# Patient Record
Sex: FEMALE | Race: WHITE | NOT HISPANIC OR LATINO | Employment: FULL TIME | URBAN - METROPOLITAN AREA
[De-identification: names, ages, dates, MRNs, and addresses within clinical notes are randomized per-mention and may not be internally consistent; named-entity substitution may affect disease eponyms.]

---

## 2023-07-03 ENCOUNTER — TELEPHONE (OUTPATIENT)
Dept: INTERNAL MEDICINE | Facility: CLINIC | Age: 63
End: 2023-07-03
Payer: COMMERCIAL

## 2023-07-03 NOTE — TELEPHONE ENCOUNTER
----- Message from Keisha Shanks MA sent at 6/30/2023  3:53 PM CDT -----  Regarding: Refill Request  Who Called: DARIA BONE [[id number]]           New Prescription or Refill : Refill      RX Name and Strength:   Fluid pill , pt does not know the name        30 day or 90 day RX: 30           Local or Mail Order :local            Preferred Pharmacy:[id number]  [address] [place] [id number]  AT [facility] OF [facility] [address]      Would the patient rather a call back or a response via [facility]? call        Best Call Back Number:  [phone] or [phone]

## 2023-07-10 ENCOUNTER — LAB VISIT (OUTPATIENT)
Dept: LAB | Facility: OTHER | Age: 63
End: 2023-07-10
Attending: FAMILY MEDICINE
Payer: COMMERCIAL

## 2023-07-10 ENCOUNTER — OFFICE VISIT (OUTPATIENT)
Dept: INTERNAL MEDICINE | Facility: CLINIC | Age: 63
End: 2023-07-10
Attending: FAMILY MEDICINE
Payer: COMMERCIAL

## 2023-07-10 VITALS
BODY MASS INDEX: 24.18 KG/M2 | OXYGEN SATURATION: 96 % | DIASTOLIC BLOOD PRESSURE: 70 MMHG | HEIGHT: 59 IN | HEART RATE: 85 BPM | WEIGHT: 119.94 LBS | SYSTOLIC BLOOD PRESSURE: 160 MMHG

## 2023-07-10 DIAGNOSIS — H66.002 NON-RECURRENT ACUTE SUPPURATIVE OTITIS MEDIA OF LEFT EAR WITHOUT SPONTANEOUS RUPTURE OF TYMPANIC MEMBRANE: ICD-10-CM

## 2023-07-10 DIAGNOSIS — H91.90 HEARING LOSS, UNSPECIFIED HEARING LOSS TYPE, UNSPECIFIED LATERALITY: ICD-10-CM

## 2023-07-10 DIAGNOSIS — I10 HTN (HYPERTENSION), BENIGN: ICD-10-CM

## 2023-07-10 DIAGNOSIS — Z00.00 PREVENTATIVE HEALTH CARE: ICD-10-CM

## 2023-07-10 DIAGNOSIS — Z12.31 ENCOUNTER FOR SCREENING MAMMOGRAM FOR MALIGNANT NEOPLASM OF BREAST: ICD-10-CM

## 2023-07-10 DIAGNOSIS — R94.31 ABNORMAL EKG: ICD-10-CM

## 2023-07-10 DIAGNOSIS — Z00.00 PREVENTATIVE HEALTH CARE: Primary | ICD-10-CM

## 2023-07-10 LAB
ALBUMIN SERPL BCP-MCNC: 4.3 G/DL (ref 3.5–5.2)
ALP SERPL-CCNC: 75 U/L (ref 55–135)
ALT SERPL W/O P-5'-P-CCNC: 23 U/L (ref 10–44)
ANION GAP SERPL CALC-SCNC: 11 MMOL/L (ref 8–16)
AST SERPL-CCNC: 25 U/L (ref 10–40)
BASOPHILS # BLD AUTO: 0.1 K/UL (ref 0–0.2)
BASOPHILS NFR BLD: 0.8 % (ref 0–1.9)
BILIRUB SERPL-MCNC: 0.4 MG/DL (ref 0.1–1)
BUN SERPL-MCNC: 8 MG/DL (ref 8–23)
CALCIUM SERPL-MCNC: 10.2 MG/DL (ref 8.7–10.5)
CHLORIDE SERPL-SCNC: 104 MMOL/L (ref 95–110)
CHOLEST SERPL-MCNC: 237 MG/DL (ref 120–199)
CHOLEST/HDLC SERPL: 3.7 {RATIO} (ref 2–5)
CO2 SERPL-SCNC: 24 MMOL/L (ref 23–29)
CREAT SERPL-MCNC: 0.8 MG/DL (ref 0.5–1.4)
DIFFERENTIAL METHOD: ABNORMAL
EOSINOPHIL # BLD AUTO: 0.2 K/UL (ref 0–0.5)
EOSINOPHIL NFR BLD: 1.4 % (ref 0–8)
ERYTHROCYTE [DISTWIDTH] IN BLOOD BY AUTOMATED COUNT: 13.6 % (ref 11.5–14.5)
EST. GFR  (NO RACE VARIABLE): >60 ML/MIN/1.73 M^2
ESTIMATED AVG GLUCOSE: 103 MG/DL (ref 68–131)
GLUCOSE SERPL-MCNC: 88 MG/DL (ref 70–110)
HBA1C MFR BLD: 5.2 % (ref 4–5.6)
HCT VFR BLD AUTO: 51.8 % (ref 37–48.5)
HDLC SERPL-MCNC: 64 MG/DL (ref 40–75)
HDLC SERPL: 27 % (ref 20–50)
HGB BLD-MCNC: 17.2 G/DL (ref 12–16)
IMM GRANULOCYTES # BLD AUTO: 0.04 K/UL (ref 0–0.04)
IMM GRANULOCYTES NFR BLD AUTO: 0.3 % (ref 0–0.5)
LDLC SERPL CALC-MCNC: 152.8 MG/DL (ref 63–159)
LYMPHOCYTES # BLD AUTO: 4.4 K/UL (ref 1–4.8)
LYMPHOCYTES NFR BLD: 34.4 % (ref 18–48)
MCH RBC QN AUTO: 31.4 PG (ref 27–31)
MCHC RBC AUTO-ENTMCNC: 33.2 G/DL (ref 32–36)
MCV RBC AUTO: 95 FL (ref 82–98)
MONOCYTES # BLD AUTO: 0.9 K/UL (ref 0.3–1)
MONOCYTES NFR BLD: 6.6 % (ref 4–15)
NEUTROPHILS # BLD AUTO: 7.3 K/UL (ref 1.8–7.7)
NEUTROPHILS NFR BLD: 56.5 % (ref 38–73)
NONHDLC SERPL-MCNC: 173 MG/DL
NRBC BLD-RTO: 0 /100 WBC
PLATELET # BLD AUTO: 295 K/UL (ref 150–450)
PMV BLD AUTO: 9.9 FL (ref 9.2–12.9)
POTASSIUM SERPL-SCNC: 4.7 MMOL/L (ref 3.5–5.1)
PROT SERPL-MCNC: 7.9 G/DL (ref 6–8.4)
RBC # BLD AUTO: 5.48 M/UL (ref 4–5.4)
SODIUM SERPL-SCNC: 139 MMOL/L (ref 136–145)
TRIGL SERPL-MCNC: 101 MG/DL (ref 30–150)
TSH SERPL DL<=0.005 MIU/L-ACNC: 1.13 UIU/ML (ref 0.4–4)
WBC # BLD AUTO: 12.83 K/UL (ref 3.9–12.7)

## 2023-07-10 PROCEDURE — 1159F PR MEDICATION LIST DOCUMENTED IN MEDICAL RECORD: ICD-10-PCS | Mod: CPTII,S$GLB,, | Performed by: FAMILY MEDICINE

## 2023-07-10 PROCEDURE — 3008F PR BODY MASS INDEX (BMI) DOCUMENTED: ICD-10-PCS | Mod: CPTII,S$GLB,, | Performed by: FAMILY MEDICINE

## 2023-07-10 PROCEDURE — 1160F PR REVIEW ALL MEDS BY PRESCRIBER/CLIN PHARMACIST DOCUMENTED: ICD-10-PCS | Mod: CPTII,S$GLB,, | Performed by: FAMILY MEDICINE

## 2023-07-10 PROCEDURE — 1160F RVW MEDS BY RX/DR IN RCRD: CPT | Mod: CPTII,S$GLB,, | Performed by: FAMILY MEDICINE

## 2023-07-10 PROCEDURE — 3078F DIAST BP <80 MM HG: CPT | Mod: CPTII,S$GLB,, | Performed by: FAMILY MEDICINE

## 2023-07-10 PROCEDURE — 99386 PR PREVENTIVE VISIT,NEW,40-64: ICD-10-PCS | Mod: S$GLB,,, | Performed by: FAMILY MEDICINE

## 2023-07-10 PROCEDURE — 99999 PR PBB SHADOW E&M-EST. PATIENT-LVL IV: ICD-10-PCS | Mod: PBBFAC,,, | Performed by: FAMILY MEDICINE

## 2023-07-10 PROCEDURE — 3077F SYST BP >= 140 MM HG: CPT | Mod: CPTII,S$GLB,, | Performed by: FAMILY MEDICINE

## 2023-07-10 PROCEDURE — 83036 HEMOGLOBIN GLYCOSYLATED A1C: CPT | Performed by: FAMILY MEDICINE

## 2023-07-10 PROCEDURE — 84443 ASSAY THYROID STIM HORMONE: CPT | Performed by: FAMILY MEDICINE

## 2023-07-10 PROCEDURE — 99999 PR PBB SHADOW E&M-EST. PATIENT-LVL IV: CPT | Mod: PBBFAC,,, | Performed by: FAMILY MEDICINE

## 2023-07-10 PROCEDURE — 99386 PREV VISIT NEW AGE 40-64: CPT | Mod: S$GLB,,, | Performed by: FAMILY MEDICINE

## 2023-07-10 PROCEDURE — 1159F MED LIST DOCD IN RCRD: CPT | Mod: CPTII,S$GLB,, | Performed by: FAMILY MEDICINE

## 2023-07-10 PROCEDURE — 80053 COMPREHEN METABOLIC PANEL: CPT | Performed by: FAMILY MEDICINE

## 2023-07-10 PROCEDURE — 3008F BODY MASS INDEX DOCD: CPT | Mod: CPTII,S$GLB,, | Performed by: FAMILY MEDICINE

## 2023-07-10 PROCEDURE — 36415 COLL VENOUS BLD VENIPUNCTURE: CPT | Performed by: FAMILY MEDICINE

## 2023-07-10 PROCEDURE — 85025 COMPLETE CBC W/AUTO DIFF WBC: CPT | Performed by: FAMILY MEDICINE

## 2023-07-10 PROCEDURE — 80061 LIPID PANEL: CPT | Performed by: FAMILY MEDICINE

## 2023-07-10 PROCEDURE — 3077F PR MOST RECENT SYSTOLIC BLOOD PRESSURE >= 140 MM HG: ICD-10-PCS | Mod: CPTII,S$GLB,, | Performed by: FAMILY MEDICINE

## 2023-07-10 PROCEDURE — 3078F PR MOST RECENT DIASTOLIC BLOOD PRESSURE < 80 MM HG: ICD-10-PCS | Mod: CPTII,S$GLB,, | Performed by: FAMILY MEDICINE

## 2023-07-10 RX ORDER — CEFDINIR 300 MG/1
600 CAPSULE ORAL DAILY
Qty: 20 CAPSULE | Refills: 0 | Status: SHIPPED | OUTPATIENT
Start: 2023-07-10 | End: 2023-07-20

## 2023-07-10 RX ORDER — FUROSEMIDE 20 MG/1
20 TABLET ORAL DAILY
Qty: 90 TABLET | Refills: 3 | Status: SHIPPED | OUTPATIENT
Start: 2023-07-10 | End: 2024-07-09

## 2023-07-10 RX ORDER — AMLODIPINE BESYLATE 10 MG/1
10 TABLET ORAL DAILY
Qty: 30 TABLET | Refills: 11 | Status: SHIPPED | OUTPATIENT
Start: 2023-07-10 | End: 2024-07-09

## 2023-07-10 NOTE — PROGRESS NOTES
"CHIEF COMPLAINT: Re-Establish a primary care physician    HISTORY OF PRESENT ILLNESS: The patient is a 63 year-old WM.  The patient has difficult to control HTN and left ear decreased hearing for 2 weeks.  It has been a while since the patient has been seen.  The patient wishes to re-establish a primary care physician.  The patient would also like to get some basic blood work done.    Seen in urgent care for the ear pain and decreased hearing left greater than right.  Short course of antibiotic.  No sustained improvement.    REVIEW OF SYSTEMS:  GENERAL: No fever, chills, fatigability or weight loss.  SKIN: No rashes, itching or changes in color or texture of skin.  HEAD: No headaches or recent head trauma.  EYES: Visual acuity fine. No photophobia, ocular pain or diplopia.  EARS: Denies ear discharge or vertigo.  NOSE: No loss of smell, no epistaxis or postnasal drip.  MOUTH & THROAT: No hoarseness or change in voice. No excessive gum bleeding.  NODES: Denies swollen glands.  CHEST: Denies BROTHERS, cyanosis, wheezing, cough and sputum production.  CARDIOVASCULAR: Denies chest pain, PND, orthopnea or reduced exercise tolerance.  ABDOMEN: Appetite fine. No weight loss. Denies diarrhea, abdominal pain, hematemesis or blood in stool.  URINARY: No flank pain, dysuria or hematuria.  PERIPHERAL VASCULAR: No claudication or cyanosis.  MUSCULOSKELETAL: No joint stiffness or swelling. Denies back pain.  NEUROLOGIC: No history of seizures, paralysis, alteration of gait or coordination.    SOCIAL HISTORY: The patient does not smoke.  The patient consumes alcohol socially.  The patient is single.    PHYSICAL EXAMINATION:   Blood pressure (!) 160/70, pulse 85, height 4' 11" (1.499 m), weight 54.4 kg (119 lb 14.9 oz), SpO2 96 %.  APPEARANCE: Well nourished, well developed, in no acute distress.    HEAD: Normocephalic, atraumatic.  EYES: PERRL. EOMI.  Conjunctivae without injection and  anicteric  EARS: TM's inflamed. Light reflex " abnormal. + retraction no perforation.    NOSE: Mucosa pink. Airway clear.  MOUTH & THROAT: No tonsillar enlargement. No pharyngeal erythema or exudate. No stridor.  NECK: Supple.   NODES: No cervical, axillary or inguinal lymph node enlargement.  CHEST: Lungs clear to auscultation.  No retractions are noted.  No rales or rhonchi are present.  CARDIOVASCULAR: Normal S1, S2. No rubs, murmurs or gallops.  ABDOMEN: Bowel sounds normal. Not distended. Soft. No tenderness or masses.  No ascites is noted.  MUSCULOSKELETAL:  There is no clubbing, cyanosis, or edema of the extremities x4.  There is full range of motion of the lumbar spine.  There is full range of motion of the extremities x4.  There is no deformity noted.    NEUROLOGIC:       Normal speech development.      Hearing normal.      Normal gait.      Motor and sensory exams grossly normal.  PSYCHIATRIC: Patient is alert and oriented x3.  Thought processes are all normal.  There is no homicidality.  There is no suicidality.  There is no evidence of psychosis.    LABORATORY/RADIOLOGY:   Chart reviewed.  We will update blood work today.    ASSESSMENT:   Annual  HTN  LOM    PLAN:  We will follow-up blood work which we expect to be normal.  Add amlodipine and Lasix  ENT  Phone BP check 2 weeks  Return to clinic in one year.

## 2023-07-11 ENCOUNTER — TELEPHONE (OUTPATIENT)
Dept: INTERNAL MEDICINE | Facility: CLINIC | Age: 63
End: 2023-07-11
Payer: COMMERCIAL

## 2023-07-11 NOTE — TELEPHONE ENCOUNTER
----- Message from Freddie Aranda MD sent at 7/11/2023 10:59 AM CDT -----  Blood work looks good.  No changes in medications.  Followup as scheduled with Cardiology.

## 2023-08-08 ENCOUNTER — OFFICE VISIT (OUTPATIENT)
Dept: OTOLARYNGOLOGY | Facility: CLINIC | Age: 63
End: 2023-08-08
Payer: COMMERCIAL

## 2023-08-08 ENCOUNTER — CLINICAL SUPPORT (OUTPATIENT)
Dept: OTOLARYNGOLOGY | Facility: CLINIC | Age: 63
End: 2023-08-08
Payer: COMMERCIAL

## 2023-08-08 VITALS
DIASTOLIC BLOOD PRESSURE: 72 MMHG | BODY MASS INDEX: 23.47 KG/M2 | SYSTOLIC BLOOD PRESSURE: 169 MMHG | WEIGHT: 116.19 LBS | HEART RATE: 72 BPM

## 2023-08-08 DIAGNOSIS — H60.63 CHRONIC OTITIS EXTERNA OF BOTH EARS, UNSPECIFIED TYPE: ICD-10-CM

## 2023-08-08 DIAGNOSIS — H65.93 BILATERAL SEROUS OTITIS MEDIA, UNSPECIFIED CHRONICITY: ICD-10-CM

## 2023-08-08 DIAGNOSIS — H90.A21 SENSORINEURAL HEARING LOSS (SNHL) OF RIGHT EAR WITH RESTRICTED HEARING OF LEFT EAR: ICD-10-CM

## 2023-08-08 DIAGNOSIS — H69.90 DYSFUNCTION OF EUSTACHIAN TUBE, UNSPECIFIED LATERALITY: Primary | ICD-10-CM

## 2023-08-08 DIAGNOSIS — H90.A32 MIXED CONDUCTIVE AND SENSORINEURAL HEARING LOSS OF LEFT EAR WITH RESTRICTED HEARING OF RIGHT EAR: ICD-10-CM

## 2023-08-08 DIAGNOSIS — Z72.0 TOBACCO ABUSE: ICD-10-CM

## 2023-08-08 DIAGNOSIS — H90.3 SENSORINEURAL HEARING LOSS, BILATERAL: Primary | ICD-10-CM

## 2023-08-08 DIAGNOSIS — J30.9 ALLERGIC RHINITIS, UNSPECIFIED SEASONALITY, UNSPECIFIED TRIGGER: ICD-10-CM

## 2023-08-08 PROCEDURE — 3044F HG A1C LEVEL LT 7.0%: CPT | Mod: CPTII,S$GLB,, | Performed by: OTOLARYNGOLOGY

## 2023-08-08 PROCEDURE — 3044F PR MOST RECENT HEMOGLOBIN A1C LEVEL <7.0%: ICD-10-PCS | Mod: CPTII,S$GLB,, | Performed by: OTOLARYNGOLOGY

## 2023-08-08 PROCEDURE — 99999 PR PBB SHADOW E&M-EST. PATIENT-LVL I: ICD-10-PCS | Mod: PBBFAC,,, | Performed by: AUDIOLOGIST

## 2023-08-08 PROCEDURE — 92567 TYMPANOMETRY: CPT | Mod: S$GLB,,, | Performed by: AUDIOLOGIST

## 2023-08-08 PROCEDURE — 99999 PR PBB SHADOW E&M-EST. PATIENT-LVL IV: ICD-10-PCS | Mod: PBBFAC,,, | Performed by: OTOLARYNGOLOGY

## 2023-08-08 PROCEDURE — 3008F PR BODY MASS INDEX (BMI) DOCUMENTED: ICD-10-PCS | Mod: CPTII,S$GLB,, | Performed by: OTOLARYNGOLOGY

## 2023-08-08 PROCEDURE — 1159F MED LIST DOCD IN RCRD: CPT | Mod: CPTII,S$GLB,, | Performed by: OTOLARYNGOLOGY

## 2023-08-08 PROCEDURE — 99204 OFFICE O/P NEW MOD 45 MIN: CPT | Mod: S$GLB,,, | Performed by: OTOLARYNGOLOGY

## 2023-08-08 PROCEDURE — 92567 PR TYMPA2METRY: ICD-10-PCS | Mod: S$GLB,,, | Performed by: AUDIOLOGIST

## 2023-08-08 PROCEDURE — 99999 PR PBB SHADOW E&M-EST. PATIENT-LVL I: CPT | Mod: PBBFAC,,, | Performed by: AUDIOLOGIST

## 2023-08-08 PROCEDURE — 3078F DIAST BP <80 MM HG: CPT | Mod: CPTII,S$GLB,, | Performed by: OTOLARYNGOLOGY

## 2023-08-08 PROCEDURE — 3078F PR MOST RECENT DIASTOLIC BLOOD PRESSURE < 80 MM HG: ICD-10-PCS | Mod: CPTII,S$GLB,, | Performed by: OTOLARYNGOLOGY

## 2023-08-08 PROCEDURE — 3077F PR MOST RECENT SYSTOLIC BLOOD PRESSURE >= 140 MM HG: ICD-10-PCS | Mod: CPTII,S$GLB,, | Performed by: OTOLARYNGOLOGY

## 2023-08-08 PROCEDURE — 92557 PR COMPREHENSIVE HEARING TEST: ICD-10-PCS | Mod: S$GLB,,, | Performed by: AUDIOLOGIST

## 2023-08-08 PROCEDURE — 92557 COMPREHENSIVE HEARING TEST: CPT | Mod: S$GLB,,, | Performed by: AUDIOLOGIST

## 2023-08-08 PROCEDURE — 3008F BODY MASS INDEX DOCD: CPT | Mod: CPTII,S$GLB,, | Performed by: OTOLARYNGOLOGY

## 2023-08-08 PROCEDURE — 3077F SYST BP >= 140 MM HG: CPT | Mod: CPTII,S$GLB,, | Performed by: OTOLARYNGOLOGY

## 2023-08-08 PROCEDURE — 99204 PR OFFICE/OUTPT VISIT, NEW, LEVL IV, 45-59 MIN: ICD-10-PCS | Mod: S$GLB,,, | Performed by: OTOLARYNGOLOGY

## 2023-08-08 PROCEDURE — 99999 PR PBB SHADOW E&M-EST. PATIENT-LVL IV: CPT | Mod: PBBFAC,,, | Performed by: OTOLARYNGOLOGY

## 2023-08-08 PROCEDURE — 1159F PR MEDICATION LIST DOCUMENTED IN MEDICAL RECORD: ICD-10-PCS | Mod: CPTII,S$GLB,, | Performed by: OTOLARYNGOLOGY

## 2023-08-08 RX ORDER — FLUOCINOLONE ACETONIDE 0.11 MG/ML
3 OIL AURICULAR (OTIC) 2 TIMES DAILY
Qty: 20 ML | Refills: 3 | Status: SHIPPED | OUTPATIENT
Start: 2023-08-08

## 2023-08-08 RX ORDER — PREDNISONE 10 MG/1
10 TABLET ORAL DAILY
Qty: 10 TABLET | Refills: 0 | Status: SHIPPED | OUTPATIENT
Start: 2023-08-08

## 2023-08-08 RX ORDER — FLUTICASONE PROPIONATE 50 MCG
1 SPRAY, SUSPENSION (ML) NASAL 2 TIMES DAILY
Qty: 11.1 ML | Refills: 11 | Status: SHIPPED | OUTPATIENT
Start: 2023-08-08 | End: 2023-09-07

## 2023-08-08 NOTE — PROGRESS NOTES
Chief Complaint   Patient presents with    Hearing Loss     Both ears, left ear mostly    Otalgia        HPI:   Carrie Michaels is 63 y.o. female who is referred by Dr. Aranda for evaluation of bilateral ear pain.  Symptoms include left ear drainage , bilateral ear pain, plugged sensation in the left ear, and nasal congestion. Symptoms began a few months ago and are stable since that time. The patient returned to the [place] area from [place], and she began having more allergy symptoms.  She previously had ETD issues in her 20s, but otherwise does not have history of recurrent OM. She was diagnosed with left OE and OM on the right in June and was treated with cortisporin ear drops, doxy, and mupirocin.  She had some improvement but began with symptoms on the right. Recent PCP visit showed persistent fluid, so she was treated with Cefdinir.  Overall she is better, but still has ear canal discomfort, pain, itching, occasional drainage, and feeling of the left ear being plugged.  Patient denies bruxism or dental issues.  Ear history: 1 previous ear infections. She reports to hearing loss more so in the left ear.       She is a current smoker.   She denies significant noise exposure.    No tinnitus reported.     No pets in the house, no carpeting, no known mold issues (though suspected), and +current smoker                      No past medical history on file.  Social History     Socioeconomic History    Marital status:    Tobacco Use    Smoking status: Every Day     Current packs/day: 10.00     Types: Cigarettes    Smokeless tobacco: Never   Substance and Sexual Activity    Alcohol use: No    Drug use: No    Sexual activity: Not Currently     No past surgical history on file.  Family History   Problem Relation Age of Onset    Cancer Mother     Heart disease Father     Asthma Daughter            Review of Systems  General: negative for chills, fever or weight loss  Psychological: negative for mood  changes or depression  Ophthalmic: negative for blurry vision, photophobia or eye pain  ENT: see HPI  Respiratory: no cough, shortness of breath, or wheezing  Cardiovascular: no chest pain or dyspnea on exertion  Gastrointestinal: no abdominal pain, change in bowel habits, or black/ bloody stools  Musculoskeletal: negative for gait disturbance or muscular weakness  Neurological: no syncope or seizures; no ataxia  Dermatological: negative for pruritis,  rash and jaundice  Hematologic/lymphatic: no easy bruising, no new adenopathy      Physical Exam:    Vitals:    08/08/23 1445   BP: (!) 169/72   Pulse: 72         Constitutional:   She is oriented to person, place, and time. Vital signs are normal. She appears well-developed and well-nourished. She appears alert. She is cooperative. Normal speech.      Head:  Normocephalic and atraumatic. Salivary glands normal.  Facial strength is normal.      Ears:  Right ear hearing normal to normal and whispered voice; external ear normal without scars, lesions, or masses; ear canal, tympanic membrane, and middle ear normal. and left ear hearing normal to normal and whispered voice; external ear normal without scars, lesions, or masses; ear canal, tympanic membrane, and middle ear normal..   Right Ear: There is swelling. Tympanic membrane is retracted. Tympanic membrane mobility is abnormal. A middle ear effusion is present.   Left Ear: There is swelling. Tympanic membrane is retracted. Tympanic membrane mobility is abnormal. A middle ear effusion is present.   Dry skin and skin irritation with erythema and mild swelling bilateral ear canals, consistent with acute on chronic otitis externa.      Nose:  Mucosal edema, rhinorrhea and septal deviation present. No polyps. Turbinates abnormal and turbinate hypertrophy (3+, boggy, congested mucosa).  Right sinus exhibits no maxillary sinus tenderness and no frontal sinus tenderness. Left sinus exhibits no maxillary sinus tenderness and  no frontal sinus tenderness.     Mouth/Throat  Oropharynx clear and moist without lesions or asymmetry, lips, teeth, and gums normal and oropharynx normal. No mucous membrane lesions. No oropharyngeal exudate, posterior oropharyngeal edema or posterior oropharyngeal erythema. Mirror exam not performed due to patient tolerance.  Mirror exam not performed due to patient tolerance.      Neck:  Neck normal without thyromegaly masses, asymmetry, normal tracheal structure, crepitus, and tenderness, thyroid normal, trachea normal, phonation normal, full range of motion with neck supple and no adenopathy. No JVD present. Carotid bruit is not present. No thyroid mass and no thyromegaly present.     She has no cervical adenopathy.     Cardiovascular:    Normal rate, regular rhythm and rate and rhythm, heart sounds, and pulses normal.              Pulmonary/Chest:   Effort and breath sounds normal.     Psychiatric:   She has a normal mood and affect. Her speech is normal and behavior is normal.     Neurological:   She is alert and oriented to person, place, and time. She has neurological normal, alert and oriented. No cranial nerve deficit.     Skin:   No abrasions, lacerations, lesions, or rashes.         Audiogram: Interpreted by me and reviewed with the patient today.  Bilateral SNHL with mixed component on left.  Tymps flat bilaterally.          Assessment:    ICD-10-CM ICD-9-CM    1. Dysfunction of Eustachian tube, unspecified laterality  H69.80 381.81       2. Mixed conductive and sensorineural hearing loss of left ear with restricted hearing of right ear  H90.A32 389.22 Ambulatory referral/consult to ENT      3. Sensorineural hearing loss (SNHL) of right ear with restricted hearing of left ear  H90.A21 389.22 Ambulatory referral/consult to ENT      4. Allergic rhinitis, unspecified seasonality, unspecified trigger  J30.9 477.9 fluticasone propionate (FLONASE) 50 mcg/actuation nasal spray      5. Bilateral serous otitis  media, unspecified chronicity  H65.93 381.4 fluticasone propionate (FLONASE) 50 mcg/actuation nasal spray      predniSONE (DELTASONE) 10 MG tablet      6. Chronic otitis externa of both ears, unspecified type  H60.63 380.23 fluocinolone acetonide oiL (DERMOTIC OIL) 0.01 % Drop      7. Tobacco abuse  Z72.0 305.1 Ambulatory referral/consult to Smoking Cessation Program        The primary encounter diagnosis was Dysfunction of Eustachian tube, unspecified laterality. Diagnoses of Mixed conductive and sensorineural hearing loss of left ear with restricted hearing of right ear, Sensorineural hearing loss (SNHL) of right ear with restricted hearing of left ear, Allergic rhinitis, unspecified seasonality, unspecified trigger, Bilateral serous otitis media, unspecified chronicity, Chronic otitis externa of both ears, unspecified type, and Tobacco abuse were also pertinent to this visit.      Plan:    We had a long discussion regarding the anatomy and function of the eustachian tube.  We discussed that the eustachian tube acts as a pump to keep the appropriate amount of pressure behind the ear drum.  I gave the patient a prescription for a short steroid taper and nasal steroid spray to be used on a daily basis, and we discussed that it will take 2-3 weeks of daily use to achieve maximal effectiveness.  We also discussed otologic valsalva daily for gently depressurizing the middle ear.      Referral to tobacco cessation program made.     I would recommend the use of an over the counter moisturizing drop such as baby oil, mineral oil, Vitamin E oil, etc on a weekly basis.    You should avoid Qtip use in the ears.    If the ear feels wet, use a hairdryer on a cool setting to dry the ears.    Fluocinolone Oil (DermOtic) drops may have been prescribed and can be used daily or weekly as needed for itching.      Repeat tymps at follow up visit.        Anna Parrish MD

## 2023-08-08 NOTE — PROGRESS NOTES
Carrie Michaels was seen today in the clinic for an audiologic evaluation.  Her main complaint was hearing loss and reported multiple ear infections bilaterally since June.    Tympanometry revealed a Type B tympanogram with a 1.02 ear canal volume for the right ear and a Type B tympanogram with a 1.10 ear canal volume for the left ear.  Audiogram results revealed a mild sloping to profound sensorineural hearing loss bilaterally.  Speech reception thresholds were obtained at 25dB for both ears and speech discrimination scores were 84% for the right ear and 88% for the left ear.    Recommendations:  Otologic evaluation  Annual evaluation  Hearing aid consult  Hearing protection in noise

## 2023-08-08 NOTE — PATIENT INSTRUCTIONS
Referral to tobacco cessation program.      I would recommend the use of an over the counter moisturizing drop such as baby oil, mineral oil, Vitamin E oil, etc on a weekly basis.    You should avoid Qtip use in the ears.    If the ear feels wet, use a hairdryer on a cool setting to dry the ears.    Fluocinolone Oil (DermOtic) drops may have been prescribed and can be used daily or weekly as needed for itching.       Short steroid taper for 4 days to help with ear fluid- watch BP and call if exceedingly high.      We had a long discussion regarding the anatomy and function of the eustachian tube.  We discussed that the eustachian tube acts as a pump to keep the appropriate amount of pressure behind the ear drum.  I gave the patient a prescription for a nasal steroid spray to be used on a daily basis, and we discussed that it will take 2-3 weeks of daily use to achieve maximal effectiveness.  We also discussed otologic valsalva daily for gently depressurizing the middle ear.       How do you use a Nasal Corticosteroid Spray?    Make sure you understand your dosing instructions. Spray only the number of prescribed sprays in each nostril. Read the package instructions before using your spray the first time.    Most corticosteroid sprays suggest the following steps:    Wash your hands well.    Gently blow your nose to clear the passageway.    Shake the container several times.    Tilt your head slightly downward.  Use the opposite hand from the nostril you will be spraying to hold the spray bottle.    Block one nostril with your finger.  Insert the nasal applicator into the other nostril.    Aim the spray toward the outer wall of the nostril.  Inhale slowly through the nose and press the .    Breathe out and repeat to apply the prescribed number of sprays.  Repeat these steps for the other nostril.     Avoid sneezing or blowing your nose right after spraying.

## 2023-09-18 ENCOUNTER — TELEPHONE (OUTPATIENT)
Dept: SMOKING CESSATION | Facility: CLINIC | Age: 63
End: 2023-09-18
Payer: COMMERCIAL

## 2023-09-18 NOTE — TELEPHONE ENCOUNTER
[facility]- called to check on patient, no show for intake appointment. Left message to call back to reschedule  [phone] or [phone]

## 2024-02-06 DIAGNOSIS — Z12.11 COLON CANCER SCREENING: ICD-10-CM

## 2025-04-03 ENCOUNTER — HOSPITAL ENCOUNTER (EMERGENCY)
Facility: HOSPITAL | Age: 65
Discharge: HOME | End: 2025-04-04
Attending: EMERGENCY MEDICINE
Payer: MEDICARE

## 2025-04-03 DIAGNOSIS — R22.2 CHEST MASS: ICD-10-CM

## 2025-04-03 DIAGNOSIS — R16.0 LIVER MASS: ICD-10-CM

## 2025-04-03 DIAGNOSIS — R10.9 ABDOMINAL PAIN, UNSPECIFIED ABDOMINAL LOCATION: ICD-10-CM

## 2025-04-03 DIAGNOSIS — R11.2 NAUSEA AND VOMITING, UNSPECIFIED VOMITING TYPE: Primary | ICD-10-CM

## 2025-04-03 DIAGNOSIS — N39.0 URINARY TRACT INFECTION WITHOUT HEMATURIA, SITE UNSPECIFIED: ICD-10-CM

## 2025-04-03 DIAGNOSIS — R79.89 ABNORMAL LFTS: ICD-10-CM

## 2025-04-03 DIAGNOSIS — M54.9 UPPER BACK PAIN ON RIGHT SIDE: ICD-10-CM

## 2025-04-03 LAB
ABSOLUTE EOSINOPHIL (OHS): 0.15 K/UL
ABSOLUTE MONOCYTE (OHS): 1.13 K/UL (ref 0.3–1)
ABSOLUTE NEUTROPHIL COUNT (OHS): 6.66 K/UL (ref 1.8–7.7)
ALBUMIN SERPL BCP-MCNC: 3.5 G/DL (ref 3.5–5.2)
ALP SERPL-CCNC: 329 UNIT/L (ref 40–150)
ALT SERPL W/O P-5'-P-CCNC: 87 UNIT/L (ref 10–44)
ANION GAP (OHS): 13 MMOL/L (ref 8–16)
AST SERPL-CCNC: 118 UNIT/L (ref 11–45)
BACTERIA #/AREA URNS AUTO: ABNORMAL /HPF
BASOPHILS # BLD AUTO: 0.08 K/UL
BASOPHILS NFR BLD AUTO: 0.8 %
BILIRUB SERPL-MCNC: 0.9 MG/DL (ref 0.1–1)
BILIRUB UR QL STRIP.AUTO: NEGATIVE
BNP SERPL-MCNC: 54 PG/ML (ref 0–99)
BUN SERPL-MCNC: 6 MG/DL (ref 8–23)
CALCIUM SERPL-MCNC: 9.3 MG/DL (ref 8.7–10.5)
CHLORIDE SERPL-SCNC: 102 MMOL/L (ref 95–110)
CLARITY UR: CLEAR
CO2 SERPL-SCNC: 20 MMOL/L (ref 23–29)
COLOR UR AUTO: YELLOW
CREAT SERPL-MCNC: 0.6 MG/DL (ref 0.5–1.4)
ERYTHROCYTE [DISTWIDTH] IN BLOOD BY AUTOMATED COUNT: 13.8 % (ref 11.5–14.5)
GFR SERPLBLD CREATININE-BSD FMLA CKD-EPI: >60 ML/MIN/1.73/M2
GLUCOSE SERPL-MCNC: 89 MG/DL (ref 70–110)
GLUCOSE UR QL STRIP: NEGATIVE
HCT VFR BLD AUTO: 50.6 % (ref 37–48.5)
HCV AB SERPL QL IA: NORMAL
HGB BLD-MCNC: 16.6 GM/DL (ref 12–16)
HGB UR QL STRIP: ABNORMAL
HIV 1+2 AB+HIV1 P24 AG SERPL QL IA: NORMAL
IMM GRANULOCYTES # BLD AUTO: 0.05 K/UL (ref 0–0.04)
IMM GRANULOCYTES NFR BLD AUTO: 0.5 % (ref 0–0.5)
INFLUENZA A MOLECULAR (OHS): NEGATIVE
INFLUENZA B MOLECULAR (OHS): NEGATIVE
KETONES UR QL STRIP: NEGATIVE
LEUKOCYTE ESTERASE UR QL STRIP: ABNORMAL
LYMPHOCYTES # BLD AUTO: 2.29 K/UL (ref 1–4.8)
MAGNESIUM SERPL-MCNC: 1.7 MG/DL (ref 1.6–2.6)
MCH RBC QN AUTO: 29.9 PG (ref 27–31)
MCHC RBC AUTO-ENTMCNC: 32.8 G/DL (ref 32–36)
MCV RBC AUTO: 91 FL (ref 82–98)
MICROSCOPIC COMMENT: ABNORMAL
NITRITE UR QL STRIP: NEGATIVE
NUCLEATED RBC (/100WBC) (OHS): 0 /100 WBC
PH UR STRIP: 7 [PH]
PLATELET # BLD AUTO: 317 K/UL (ref 150–450)
PMV BLD AUTO: 10.5 FL (ref 9.2–12.9)
POTASSIUM SERPL-SCNC: 4 MMOL/L (ref 3.5–5.1)
PROT SERPL-MCNC: 6.9 GM/DL (ref 6–8.4)
PROT UR QL STRIP: NEGATIVE
RBC # BLD AUTO: 5.56 M/UL (ref 4–5.4)
RBC #/AREA URNS AUTO: 3 /HPF (ref 0–4)
RELATIVE EOSINOPHIL (OHS): 1.4 %
RELATIVE LYMPHOCYTE (OHS): 22.1 % (ref 18–48)
RELATIVE MONOCYTE (OHS): 10.9 % (ref 4–15)
RELATIVE NEUTROPHIL (OHS): 64.3 % (ref 38–73)
SARS-COV-2 RDRP RESP QL NAA+PROBE: NEGATIVE
SODIUM SERPL-SCNC: 135 MMOL/L (ref 136–145)
SP GR UR STRIP: <1.005
SQUAMOUS #/AREA URNS AUTO: 1 /HPF
TROPONIN I SERPL HS-MCNC: 14 NG/L
UROBILINOGEN UR STRIP-ACNC: NEGATIVE EU/DL
WBC # BLD AUTO: 10.36 K/UL (ref 3.9–12.7)
WBC #/AREA URNS AUTO: 14 /HPF (ref 0–5)

## 2025-04-03 PROCEDURE — 93005 ELECTROCARDIOGRAM TRACING: CPT

## 2025-04-03 PROCEDURE — 81001 URINALYSIS AUTO W/SCOPE: CPT | Performed by: STUDENT IN AN ORGANIZED HEALTH CARE EDUCATION/TRAINING PROGRAM

## 2025-04-03 PROCEDURE — 96374 THER/PROPH/DIAG INJ IV PUSH: CPT

## 2025-04-03 PROCEDURE — 86803 HEPATITIS C AB TEST: CPT | Performed by: PHYSICIAN ASSISTANT

## 2025-04-03 PROCEDURE — 83690 ASSAY OF LIPASE: CPT | Performed by: EMERGENCY MEDICINE

## 2025-04-03 PROCEDURE — 83735 ASSAY OF MAGNESIUM: CPT | Performed by: STUDENT IN AN ORGANIZED HEALTH CARE EDUCATION/TRAINING PROGRAM

## 2025-04-03 PROCEDURE — 99285 EMERGENCY DEPT VISIT HI MDM: CPT | Mod: 25

## 2025-04-03 PROCEDURE — 85025 COMPLETE CBC W/AUTO DIFF WBC: CPT | Performed by: STUDENT IN AN ORGANIZED HEALTH CARE EDUCATION/TRAINING PROGRAM

## 2025-04-03 PROCEDURE — 63600175 PHARM REV CODE 636 W HCPCS: Performed by: EMERGENCY MEDICINE

## 2025-04-03 PROCEDURE — 80074 ACUTE HEPATITIS PANEL: CPT | Performed by: EMERGENCY MEDICINE

## 2025-04-03 PROCEDURE — 80053 COMPREHEN METABOLIC PANEL: CPT | Performed by: STUDENT IN AN ORGANIZED HEALTH CARE EDUCATION/TRAINING PROGRAM

## 2025-04-03 PROCEDURE — 96361 HYDRATE IV INFUSION ADD-ON: CPT

## 2025-04-03 PROCEDURE — 87389 HIV-1 AG W/HIV-1&-2 AB AG IA: CPT | Performed by: PHYSICIAN ASSISTANT

## 2025-04-03 PROCEDURE — 87086 URINE CULTURE/COLONY COUNT: CPT | Performed by: STUDENT IN AN ORGANIZED HEALTH CARE EDUCATION/TRAINING PROGRAM

## 2025-04-03 PROCEDURE — 93010 ELECTROCARDIOGRAM REPORT: CPT | Mod: ,,, | Performed by: INTERNAL MEDICINE

## 2025-04-03 PROCEDURE — 87502 INFLUENZA DNA AMP PROBE: CPT | Performed by: STUDENT IN AN ORGANIZED HEALTH CARE EDUCATION/TRAINING PROGRAM

## 2025-04-03 PROCEDURE — U0002 COVID-19 LAB TEST NON-CDC: HCPCS | Performed by: STUDENT IN AN ORGANIZED HEALTH CARE EDUCATION/TRAINING PROGRAM

## 2025-04-03 PROCEDURE — 83880 ASSAY OF NATRIURETIC PEPTIDE: CPT | Performed by: STUDENT IN AN ORGANIZED HEALTH CARE EDUCATION/TRAINING PROGRAM

## 2025-04-03 PROCEDURE — 84484 ASSAY OF TROPONIN QUANT: CPT | Performed by: STUDENT IN AN ORGANIZED HEALTH CARE EDUCATION/TRAINING PROGRAM

## 2025-04-03 RX ORDER — ONDANSETRON HYDROCHLORIDE 2 MG/ML
4 INJECTION, SOLUTION INTRAVENOUS
Status: COMPLETED | OUTPATIENT
Start: 2025-04-03 | End: 2025-04-03

## 2025-04-03 RX ADMIN — SODIUM CHLORIDE, POTASSIUM CHLORIDE, SODIUM LACTATE AND CALCIUM CHLORIDE 1000 ML: 600; 310; 30; 20 INJECTION, SOLUTION INTRAVENOUS at 11:04

## 2025-04-03 RX ADMIN — ONDANSETRON 4 MG: 2 INJECTION INTRAMUSCULAR; INTRAVENOUS at 11:04

## 2025-04-04 ENCOUNTER — RESULTS FOLLOW-UP (OUTPATIENT)
Dept: EMERGENCY MEDICINE | Facility: HOSPITAL | Age: 65
End: 2025-04-04

## 2025-04-04 VITALS
DIASTOLIC BLOOD PRESSURE: 76 MMHG | HEART RATE: 89 BPM | RESPIRATION RATE: 18 BRPM | TEMPERATURE: 98 F | SYSTOLIC BLOOD PRESSURE: 186 MMHG | OXYGEN SATURATION: 95 %

## 2025-04-04 LAB
HAV IGM SERPL QL IA: NORMAL
HBV CORE IGM SERPL QL IA: NORMAL
HBV SURFACE AG SERPL QL IA: NORMAL
HCV AB SERPL QL IA: NORMAL
LIPASE SERPL-CCNC: 48 U/L (ref 4–60)
OHS QRS DURATION: 66 MS
OHS QRS DURATION: 70 MS
OHS QTC CALCULATION: 407 MS
OHS QTC CALCULATION: 414 MS

## 2025-04-04 PROCEDURE — 96361 HYDRATE IV INFUSION ADD-ON: CPT

## 2025-04-04 RX ORDER — ONDANSETRON 4 MG/1
4 TABLET, ORALLY DISINTEGRATING ORAL EVERY 8 HOURS PRN
Qty: 12 TABLET | Refills: 0 | Status: SHIPPED | OUTPATIENT
Start: 2025-04-04

## 2025-04-04 NOTE — FIRST PROVIDER EVALUATION
Medical screening examination initiated.  I have conducted a focused provider triage encounter, findings are as follows:    Brief history of present illness:  65 yo F w/HTN, presenting with dark urine, R flank pain, epigastric pain, productive cough, lightheadedness, with nausea, vomiting, diarrhea x 1 week. Has not been taking her BP meds in weeks due to not feeling well. No significant headache, no diplopia.    Had the flu 2 weeks ago, no recent antibiotics    Vitals:    04/03/25 1921   BP: (!) 233/106   Pulse: 99   Resp: 20   Temp: 98.3 °F (36.8 °C)   TempSrc: Oral   SpO2: (!) 94%       Pertinent physical exam:  epigastric pain, +R CVA tenderness    Brief workup plan:  labs, CXR, EKG    Preliminary workup initiated; this workup will be continued and followed by the physician or advanced practice provider that is assigned to the patient when roomed.

## 2025-04-04 NOTE — DISCHARGE INSTRUCTIONS
Follow up with Oncology in the next 1-2 days soon as possible.  Referral has been placed to Oncology for urgent follow up.  Return to ED if you develop any further nausea/vomiting or if you develop any new or worsening symptoms.

## 2025-04-04 NOTE — ED PROVIDER NOTES
Encounter Date: 4/3/2025       History     Chief Complaint   Patient presents with    Emesis     +N/V/D x1 week. Right side flank pain and dysuria     HPI    Patient is a 64-year-old male with past medical history hypertension that is presenting for nausea, vomiting, diarrhea.  The patient states that she has had nausea/vomiting, diarrhea for the past 1+ week.  Patient attributed to the flu however patient continues to have symptoms.  Patient also has developed right-sided flank pain, right-sided scapular pain with dysuria.  Patient denies any fevers, chills, chest pains, shortness of breath.  Patient also states that she has had mild cough with mild production.    Review of patient's allergies indicates:   Allergen Reactions    Contrast media Anaphylaxis    Losartan Anaphylaxis, Nausea Only and Swelling    Codeine     Penicillins     Sulfa (sulfonamide antibiotics) Hives     History reviewed. No pertinent past medical history.  History reviewed. No pertinent surgical history.  Family History   Problem Relation Name Age of Onset    Cancer Mother      Heart disease Father      Asthma Daughter       Social History[1]  Review of Systems   Constitutional:         No other system positives other than aforementioned as reported by patient       Physical Exam     Initial Vitals [04/03/25 1921]   BP Pulse Resp Temp SpO2   (!) 233/106 99 20 98.3 °F (36.8 °C) (!) 94 %      MAP       --         Physical Exam    Vitals reviewed.  Constitutional: She appears well-developed and well-nourished. She is not diaphoretic. No distress.   Well-appearing 64-year-old female, no distress, appropriately conversational   Cardiovascular:  Normal rate, regular rhythm, normal heart sounds and intact distal pulses.     Exam reveals no gallop and no friction rub.       No murmur heard.  Pulmonary/Chest: Breath sounds normal. No respiratory distress. She has no wheezes. She has no rales.   Abdominal: Abdomen is soft. Bowel sounds are normal. She  exhibits no distension. There is abdominal tenderness.   Mild tenderness to palpation right upper quadrant.  No guarding, no rigidity There is no rebound and no guarding.   Musculoskeletal:         General: No tenderness or edema. Normal range of motion.     Neurological: She is alert and oriented to person, place, and time. She has normal strength. GCS score is 15.   Skin: Skin is warm and dry. No rash noted. No erythema. No pallor.         ED Course   Procedures  Labs Reviewed   CULTURE, URINE - Abnormal       Result Value    Urine Culture 50,000 - 99,999 cfu/ml Gram-negative Rods (*)    COMPREHENSIVE METABOLIC PANEL - Abnormal    Sodium 135 (*)     Potassium 4.0      Chloride 102      CO2 20 (*)     Glucose 89      BUN 6 (*)     Creatinine 0.6      Calcium 9.3      Protein Total 6.9      Albumin 3.5      Bilirubin Total 0.9       (*)      (*)     ALT 87 (*)     Anion Gap 13      eGFR >60     URINALYSIS, REFLEX TO URINE CULTURE - Abnormal    Color, UA Yellow      Appearance, UA Clear      pH, UA 7.0      Spec Grav UA <1.005 (*)     Protein, UA Negative      Glucose, UA Negative      Ketones, UA Negative      Bilirubin, UA Negative      Blood, UA Trace (*)     Nitrites, UA Negative      Urobilinogen, UA Negative      Leukocyte Esterase, UA 2+ (*)    CBC WITH DIFFERENTIAL - Abnormal    WBC 10.36      RBC 5.56 (*)     HGB 16.6 (*)     HCT 50.6 (*)     MCV 91      MCH 29.9      MCHC 32.8      RDW 13.8      Platelet Count 317      MPV 10.5      Nucleated RBC 0      Neut % 64.3      Lymph % 22.1      Mono % 10.9      Eos % 1.4      Basophil % 0.8      Imm Grans % 0.5      Neut # 6.66      Lymph # 2.29      Mono # 1.13 (*)     Eos # 0.15      Baso # 0.08      Imm Grans # 0.05 (*)    URINALYSIS MICROSCOPIC - Abnormal    RBC, UA 3      WBC, UA 14 (*)     Bacteria, UA Occasional      Squamous Epithelial Cells, UA 1      Microscopic Comment       INFLUENZA A & B BY MOLECULAR - Normal    INFLUENZA A MOLECULAR  Negative      INFLUENZA B MOLECULAR  Negative     B-TYPE NATRIURETIC PEPTIDE - Normal    BNP 54     MAGNESIUM - Normal    Magnesium  1.7     HEPATITIS C ANTIBODY - Normal    Hep C Ab Interp Non-Reactive     HIV 1 / 2 ANTIBODY - Normal    HIV 1/2 Ag/Ab Non-Reactive     SARS-COV-2 RNA AMPLIFICATION, QUAL - Normal    SARS COV-2 Molecular Negative     TROPONIN I HIGH SENSITIVITY - Normal    Troponin High Sensitive 14     HEPATITIS PANEL, ACUTE - Normal    Hep BsAg Interp Non-Reactive      Hep B Core IgM Interp Non-Reactive      Hep A IgM Interp Non-Reactive      Hep C Ab Interp Non-Reactive     LIPASE - Normal    Lipase Level 48     CBC W/ AUTO DIFFERENTIAL    Narrative:     The following orders were created for panel order CBC auto differential.  Procedure                               Abnormality         Status                     ---------                               -----------         ------                     CBC with Differential[[id number]]       Abnormal            Final result                 Please view results for these tests on the individual orders.        ECG Results              EKG 12-lead (Final result)        Collection Time Result Time QRS Duration OHS QTC Calculation    04/03/25 19:30:00 04/04/25 16:08:50 70 414                     Final result by Interface, Lab In [facility] (04/04/25 16:08:53)                   Narrative:    Test Reason : M54.9,    Vent. Rate :  95 BPM     Atrial Rate :  95 BPM     P-R Int : 114 ms          QRS Dur :  70 ms      QT Int : 330 ms       P-R-T Axes :  77  65  92 degrees    QTcB Int : 414 ms    Normal sinus rhythm  Possible Left atrial enlargement  Abnormal ECG  When compared with ECG of 27-Aug-2014 19:30,  ST more depressed in Lateral leads  T wave inversion more evident in Lateral leads  Confirmed by Cira Scott ([id number]) on 4/4/2025 4:08:49 PM    Referred By: AAAREFERRAL SELF           Confirmed By: Cira Scott                                  Imaging Results               CT Chest Without Contrast (Final result)  Result time 04/04/25 05:21:55      Final result by Roly Barnhart MD (04/04/25 05:21:55)                   Impression:      Right mainstem bronchial wall thickening extending into lobar and segmental bronchi, suspicious for neoplastic involvement.  Associated complete obstruction of right upper lobe bronchi, with postobstructive complete atelectasis of the right upper lobe.  Correlate clinically.  Consider tissue diagnosis via bronchoscopy.    Mediastinal lymphadenopathy, suspicious for metastatic disease.    Innumerable hepatic lesions again seen, concerning for metastases of unknown primary.    In addition to bronchial wall thickening, the right hemithorax demonstrates irregular interlobular septal thickening and small pleural effusion, concerning for pulmonary lymphangitic spread of carcinoma.    Bilateral thyroid lobe nodules.  Consider outpatient thyroid ultrasound for further characterization.    Electronically signed by resident: Ebenezer Saunders  Date:    04/04/2025  Time:    01:56    Electronically signed by: Roly Barnhart  Date:    04/04/2025  Time:    05:21               Narrative:    EXAMINATION:  CT CHEST WITHOUT CONTRAST    CLINICAL HISTORY:  possible mass    TECHNIQUE:  Low dose axial images, sagittal and coronal reformations were obtained from the thoracic inlet to the lung bases. Contrast was not administered.    COMPARISON:  Chest radiograph 04/03/2025    FINDINGS:  Base of Neck and thoracic soft tissues: Bilateral thyroid lobe nodules measuring up to 1.0 cm.  No supraclavicular or axillary adenopathy.    Thoracic aorta: Left-sided aortic arch.  No aneurysm.  Moderate atherosclerosis.    Heart: Borderline-enlarged left atrium.  Prominent left ventricle, difficult to further characterize on these noncontrast images..  Minimal pericardial fluid/effusion.Multivessel coronary artery atherosclerosis.  Aortic and mitral annular  calcifications.    Pulmonary vasculature: Main pulmonary artery is normal size.  No significant pulmonary venous distension.    Cyndi/Mediastinum: Prominent to mildly enlarged mediastinal lymph nodes, for example a pretracheal lymph node measures approximately 1.4 x 1.1 cm.  (2-46).  There is some poorly defined subcarinal soft tissue fullness suspicious for possible adenopathy.    Lungs/Pleura/Airways: Trachea is patent.  The right upper lobe bronchi are obstructed with complete atelectasis of the right upper lobe.  No definite mass lesion identified, noting suboptimal evaluation due to lack of IV contrast.    Small layering right pleural effusion.    There is diffuse bronchial wall thickening, more pronounced in the right lung as well as superimposed irregular interlobular septal thickening in the right lung.    No confluent airspace opacification    No right or left pneumothorax.    Upper Abdomen and Esophagus: Innumerable hepatic lesions again seen.  Please see separately dictated CT abdomen pelvis from 04/03/2025 for detailed findings.    Bones: Bones are demineralized.  There is mild degenerative change in the thoracic spine.  No acute fracture or aggressive osseous lesion.                        Preliminary result by Ebenezer Saunders MD (04/04/25 02:07:33)                   Impression:      Near-total lobar atelectasis of the right upper lobe.  No definite obstructing mass identified, noting suboptimal evaluation due to lack of IV contrast.  Findings remain suspicious for underlying mass given other findings as below.    Innumerable hepatic lesions again seen, concerning for metastases of unknown primary.    Right hemithorax predominant bronchial wall thickening with irregular interlobular septal thickening and small pleural effusion, concerning for lymphangitic spread of carcinoma.    Prominent mediastinal lymph nodes, concerning for additional metastases.    Bilateral thyroid lobe nodules.  Consider  outpatient thyroid ultrasound for further characterization.    Electronically signed by resident: Ebenezer Saunders  Date:    04/04/2025  Time:    01:56                 Narrative:    EXAMINATION:  CT CHEST WITHOUT CONTRAST    CLINICAL HISTORY:  possible mass;    TECHNIQUE:  Low dose axial images, sagittal and coronal reformations were obtained from the thoracic inlet to the lung bases. Contrast was not administered.    COMPARISON:  Chest radiograph 04/03/2025    FINDINGS:  Base of Neck and thoracic soft tissues: Bilateral thyroid lobe nodules measuring up to 1.0 cm.  No supraclavicular or axillary adenopathy.    Aorta: Left-sided aortic arch.  No aneurysm.  Moderate atherosclerosis.    Heart: Normal size.  Minimal effusion.Multivessel coronary artery atherosclerosis.  Aortic and mitral annular calcifications.    Pulmonary vasculature: Main pulmonary artery is normal size.  No significant pulmonary venous distension.    Cyndi/Mediastinum: Prominent upper limit of normal mediastinal lymph nodes, for example a pretracheal lymph node measuring 1.0 cm (2-46).    Lungs/Pleura/Airways: Trachea is patent.  The right upper lobe bronchi are obstructed with near-total postobstructive atelectasis of the right upper lobe.  No definite mass lesion identified, noting suboptimal evaluation due to lack of IV contrast.  Small layering right pleural effusion.  There is diffuse bronchial wall thickening, more pronounced in the right lung as well as superimposed irregular interlobular septal thickening in the right lung.  No confluent airspace opacification or pneumothorax.    Upper Abdomen and Esophagus: Innumerable hepatic lesions again seen.  Please see separately dictated CT abdomen pelvis from 04/03/2025 for detailed findings.    Bones: Bones are demineralized.  There is mild degenerative change in the thoracic spine.  No acute fracture or aggressive osseous lesion.                                        US Abdomen Limited (Final  result)  Result time 04/04/25 01:02:39      Final result by Ebenezer Rowley MD (04/04/25 01:02:39)                   Impression:      Multiple hepatic mass lesions are again noted, the appearance of which is consistent with malignancy/metastatic disease, correlating with the appearance on CT examination.    There is a finding of the gallbladder that may relate to a small 3 mm calculus at here into the gallbladder wall although could represent a small gallbladder polyp.    Gallbladder wall thickening appearing somewhat eccentric on submitted imaging, there is no hypervascularity of the gallbladder wall the technologist indicates a negative sonographic Hamilton's sign, correlation for signs or symptoms of acute versus chronic cholecystitis is needed given the appearance of gallbladder wall thickening, however given the eccentric character, and the abnormality of the liver the possibility of neoplasm is a consideration.    This report was flagged in Epic as abnormal.      Electronically signed by: Ebenezer Rowley  Date:    04/04/2025  Time:    01:02               Narrative:    EXAMINATION:  US ABDOMEN LIMITED    CLINICAL HISTORY:  RUQ, elevated LFTs;    TECHNIQUE:  Limited ultrasound of the right upper quadrant of the abdomen (including pancreas, liver, gallbladder, common bile duct, and spleen) was performed.    COMPARISON:  CT examination abdomen and pelvis April 3, 2025    FINDINGS:  There is limited visualization of the pancreas.  In the pancreatic duct measures 2.6 mm, upper normal, mild accentuated echogenicity of the pancreas noted, otherwise evaluation is limited.    The gallbladder is identified.  There is appearance that may relate to a small gallstone measuring 3 mm, appearing adherent to the gallbladder wall although this could represent a small gallbladder polyp.  There is appearance of gallbladder wall thickening measuring up to approximately 3.7 mm, on submitted imaging this appears somewhat eccentric.   There is no abnormal gallbladder wall hypervascularity, and the technologist indicates a negative sonographic Hamilton's sign.    There is no abnormal biliary dilatation, the common duct measures approximately 2.7 mm.  There is appearance consistent with enlarged lymph nodes of the rema hepatis as noted on CT examination.    Abnormal appearance of the liver consistent with multiple hepatic mass lesions noted corresponding to the appearance on CT examination.  The visualized IVC appears unremarkable.    The spleen measures approximately 7.7 cm in length, there is no sonographic evidence for focal splenic mass lesion.    Hypoechoic findings of the left kidney correlate with the appearance of cysts on CT.  Evaluation is limited.                                        CT Abdomen Pelvis  Without Contrast (Final result)  Result time 04/04/25 00:35:16      Final result by Ebenezer Rowley MD (04/04/25 00:35:16)                   Impression:      Abnormal appearance of the liver with multiple hepatic mass lesions noted, the appearance is consistent with malignancy and most concerning for metastatic disease for which clinical and historical correlation is needed, if previously unknown further evaluation and workup is recommended.    Findings referable to the gallbladder for which correlation for gallbladder symptomatology is needed, if indicated ultrasound follow-up.    Prominent periportal lymph nodes, as above.    Mild air within the urinary bladder, this may relate to instrumentation however can be seen with infection, clinical correlation is needed.    Findings at the right lung base as discussed above.    Minimal pericardial effusion.    Infrarenal abdominal aortic aneurysm, follow-up is recommended.    Additional findings as above.    This report was flagged in Epic as abnormal.      Electronically signed by: Ebenezer Rowley  Date:    04/04/2025  Time:    00:35               Narrative:    EXAMINATION:  CT ABDOMEN  PELVIS WITHOUT CONTRAST    CLINICAL HISTORY:  Abdominal abscess/infection suspected;elevated LFTs;    TECHNIQUE:  Low dose axial images, sagittal and coronal reformations were obtained from the lung bases to the pubic symphysis.  Intravenous contrast and oral contrast was not utilized, this diminishes the sensitivity of the examination.    COMPARISON:  None    FINDINGS:  There is minimal pleural effusion noted at the right lung base.  There is appearance of minimal peribronchial thickening at the right lung base with mild patchy ground-glass opacities.    Mitral valve calcification is noted.  There is appearance of minimal pericardial effusion.    The stomach demonstrates nonspecific appearance of mild fluid and air within the gastric lumen.  The gallbladder is incompletely distended, mild density of the gallbladder may relate to sludge and/or cholelithiasis, there is mild pericholecystic haziness, correlation for gallbladder symptomatology is needed, if indicated ultrasound follow-up.    There is no evidence for peripancreatic inflammatory change there is no abnormal pancreatic or biliary ductal dilatation.    There are multiple hepatic lesions noted, the appearance is not consistent with cysts, and is most consistent with malignancy, potentially representing metastatic disease, for which clinical and historical correlation is needed if previously unknown further evaluation and workup is recommended.  There are prominent periportal lymph nodes noted, with a prominent lymph node in particular measuring approximately 1.3 x 3.7 cm in size.    The spleen and adrenal glands appear unremarkable.  There is no evidence for ureteral calculus or obstructive uropathy bilaterally.  Bilateral renal hypodensities are noted, including a hypodensity on the right measuring approximately 1.6 cm, 3.5 Hounsfield units, likely a cyst, there are hypodensities on the left, there is a hypodensity at the upper pole measuring approximately  3.5 cm, 5.1 Hounsfield units, this may represent a cyst, there is appearance that may relate to mild calcification of the wall.  There is a hypodensity at the midpole measuring 3.4 cm, 13 Hounsfield units, and a hypodensity at the mid to lower pole measuring 8.4 Hounsfield units, likely a cyst.    The arterial vascular structures including the abdominal aorta demonstrate atherosclerotic change, there is infrarenal abdominal aortic aneurysmal dilatation measuring up to 2.7 cm anterior to posterior dimension.    Mild air within the urinary bladder may relate to instrumentation however can be seen with infection, clinical and historical correlation is needed.  The patient appears status post hysterectomy.    There is a suspected prominent duodenal diverticulum without inflammatory change.  There is no small bowel obstructive process.  The appendix is identified, it does not appear inflamed.  There is no evidence for inflammatory or obstructive process of the colon.  There is no evidence for free intraperitoneal air.    The visualized osseous structures demonstrate chronic change.                                       X-Ray Chest AP Portable (Edited Result - FINAL)  Result time 04/04/25 17:45:36      Addendum (preliminary) 1 of 1 by Roly Barnhart MD (04/04/25 17:45:36)      Roly Barnhart M.D., first observed the critical findings on 04/04/2025 at 00:22, and sent the results to Beto Joel MD, via Epic Secure Chat message, on 04/04/2025 at 00:26.  Patient name and medical record number were specified/linked in the message.The recipient immediately responded, acknowledging receipt of the information.      Electronically signed by: Roly Barnhart  Date:    04/04/2025  Time:    17:45                 Final result by Roly Barnhart MD (04/04/25 00:26:45)                   Impression:      New abnormal opacity in the right lung, suspicious for atelectasis or pneumonia, possibly postobstructive.  Underlying mass,  especially in the right superior hilum, is not excluded.  Other underlying pathology not excluded.    Recommendations include clinical correlation and follow-up chest CT (preferably with IV contrast, if clinically appropriate).    This report was flagged in Epic as abnormal.      Electronically signed by: Roly Barnhart  Date:    04/04/2025  Time:    00:26               Narrative:    EXAMINATION:  XR CHEST AP PORTABLE    CLINICAL HISTORY:  cough;    TECHNIQUE:  Single frontal view of the chest was performed.    COMPARISON:  PA and lateral chest x-rays 08/27/2014 and 04/10/2008    FINDINGS:  New right suprahilar opacity suspicious for atelectasis, pneumonia, or other abnormality in a portion of the right lung.  Elevated appearance of the presumed right minor pleural fissure and right hilum; underlying right hilar mass not excluded.    New bandlike opacity in the left mid lung, likely subsegmental atelectasis or scarring.    Pulmonary emphysema.    Midline thoracic trachea.  Pre-existing atherosclerotic calcification thoracic aortic arch.    Stable radiographic appearance of the cardiopericardial silhouette, which is at the upper limits of normal size.    No pneumothorax or lateral costophrenic angle blunting.                                       Medications   lactated ringers bolus 1,000 mL (0 mLs Intravenous Stopped 4/4/25 0048)   ondansetron injection 4 mg (4 mg Intravenous Given 4/3/25 5385)     Medical Decision Making  1. Differential Diagnosis includes:  COVID, flu, pancreatitis, cholecystitis, UTI, pyelonephritis, cancer      2. Co Morbidities include:  Elderly   Increased patient risk:  history of tobacco use      3. External notes reviewed:  Previous clinic notes      4. History sources independently obtained include:  Family      5. Discussion of management with: n/a      6. Independent intrepretation of tests include: EKG: Rate 93, normal sinus rhythm, no obvious ST-T wave elevations      7. Diagnostic  tests or therapies considered but not ordered: n/a      8. Social determinants of health: n/a      9. Shared decision making includes:  Patient is a 64-year-old female presenting for nausea, vomiting.  Patient found to have suspicious mass on chest x-ray.  Patient also found to have suspicious findings on CT specifically of the liver.  Consider possible metastatic cancer.  Discussed with the patient and family.  Abnormal LFTs likely secondary to metastatic liver disease.  Otherwise patient's symptoms has been resolved after symptomatic treatment in ED has been handed off to oncoming nighttime physician pending CT chest, lipase and ultimately disposition.  Patient and family agree with treatment and plan.      Amount and/or Complexity of Data Reviewed  Labs: ordered. Decision-making details documented in ED Course.  Radiology: ordered.    Risk  Prescription drug management.  Decision regarding hospitalization.               ED Course as of 04/05/25 0715   Fri Apr 04, 2025   0031 Discussed x-ray findings with radiologist.  Concern for possible mass.  Added on CT chest without contrast.  Patient agrees with treatment and plan. [RT]   0207 Reviewed initial labs and imaging with the patient and family.  Screening for cancer, consider possible metastatic.  Patient states that her symptoms has been resolved at this time. [RT]   0208 Comprehensive metabolic panel(!)  Abnormal LFTs noted, likely secondary to metastatic cancer. [RT]   Sat Apr 05, 2025   0714 Patient was discharged without antibiotics, urine culture resulted.  Antibiotics were called in.  I left a message on patient's voicemail. [RT]   0714 Urine Culture(!): 50,000 - 99,999 cfu/ml Gram-negative Rods [RT]      ED Course User Index  [RT] Beto Joel MD                           Clinical Impression:  Final diagnoses:  [M54.9] Upper back pain on right side  [R11.2] Nausea and vomiting, unspecified vomiting type (Primary)  [R10.9] Abdominal pain, unspecified  abdominal location  [R22.2] Chest mass  [R16.0] Liver mass  [R79.89] Abnormal LFTs  [N39.0] Urinary tract infection without hematuria, site unspecified          ED Disposition Condition    Discharge Stable          ED Prescriptions       Medication Sig Dispense Start Date End Date Auth. Provider    ondansetron (ZOFRAN-ODT) 4 MG TbDL Take 1 tablet (4 mg total) by mouth every 8 (eight) hours as needed. 12 tablet 4/4/2025 -- Beto Joel MD          Follow-up Information       Follow up With Specialties Details Why Contact Info    Freddie Aranda MD Family Medicine In 2 days For re-evaluation [address] [place]  [place] [phone]  [facility] [id number] [phone]      Oncology  In 2 days For re-evaluation                Beto Joel MD  04/04/25 4945       [1]   Social History  Tobacco Use    Smoking status: Every Day     Current packs/day: 10.00     Types: Cigarettes    Smokeless tobacco: Never   Substance Use Topics    Alcohol use: No    Drug use: No        Beto oJel MD  04/05/25 5153

## 2025-04-04 NOTE — ED NOTES
Patient identifiers verified and correct for  Ms Michaels  C/C:  Pain to right upper back, upper abd pain, weakness SEE NN  APPEARANCE: awake and alert in NAD. PAIN  10/10  SKIN: warm, dry and intact. No breakdown or bruising.  MUSCULOSKELETAL: Patient moving all extremities spontaneously, no obvious swelling or deformities noted. Ambulates independently.  RESPIRATORY: Denies shortness of breath.Respirations unlabored. Positive cough  CARDIAC: Denies CP, 2+ distal pulses; no peripheral edema  ABDOMEN: Abdomen soft, reports upper abd pain,  emeis x1 today   : voids spontaneously, denies difficulty  Neurologic: AAO x 4; follows commands equal strength in all extremities; denies numbness/tingling. Denies dizziness  Denies new weakness reports all over gen weakness

## 2025-04-04 NOTE — PROVIDER PROGRESS NOTES - EMERGENCY DEPT.
Encounter Date: 4/3/2025    ED Physician Progress Notes        I assumed care of patient at sign out pending: CT chest.    63yo F here with nausea/vomitign for a week. Given zofran and feeling better. Labs - LFTs abnormal. Cxr with ?mass. CT abd - likely hepatic masses. Getting chest CT. Patient aware. If feeling well, can dc with onc f/u, zofran     Summary of Results:    CT Chest Without Contrast         US Abdomen Limited   Final Result   Abnormal      Multiple hepatic mass lesions are again noted, the appearance of which is consistent with malignancy/metastatic disease, correlating with the appearance on CT examination.      There is a finding of the gallbladder that may relate to a small 3 mm calculus at here into the gallbladder wall although could represent a small gallbladder polyp.      Gallbladder wall thickening appearing somewhat eccentric on submitted imaging, there is no hypervascularity of the gallbladder wall the technologist indicates a negative sonographic Hamilton's sign, correlation for signs or symptoms of acute versus chronic cholecystitis is needed given the appearance of gallbladder wall thickening, however given the eccentric character, and the abnormality of the liver the possibility of neoplasm is a consideration.      This report was flagged in Epic as abnormal.         Electronically signed by: Ebenezer Rowley   Date:    04/04/2025   Time:    01:02      CT Abdomen Pelvis  Without Contrast   Final Result   Abnormal      Abnormal appearance of the liver with multiple hepatic mass lesions noted, the appearance is consistent with malignancy and most concerning for metastatic disease for which clinical and historical correlation is needed, if previously unknown further evaluation and workup is recommended.      Findings referable to the gallbladder for which correlation for gallbladder symptomatology is needed, if indicated ultrasound follow-up.      Prominent periportal lymph nodes, as above.       Mild air within the urinary bladder, this may relate to instrumentation however can be seen with infection, clinical correlation is needed.      Findings at the right lung base as discussed above.      Minimal pericardial effusion.      Infrarenal abdominal aortic aneurysm, follow-up is recommended.      Additional findings as above.      This report was flagged in Epic as abnormal.         Electronically signed by: Ebenezer Rowley   Date:    04/04/2025   Time:    00:35      X-Ray Chest AP Portable   Final Result   Abnormal      New abnormal opacity in the right lung, suspicious for atelectasis or pneumonia, possibly postobstructive.  Underlying mass, especially in the right superior hilum, is not excluded.  Other underlying pathology not excluded.      Recommendations include clinical correlation and follow-up chest CT (preferably with IV contrast, if clinically appropriate).      This report was flagged in Epic as abnormal.         Electronically signed by: Roly Barnhart   Date:    04/04/2025   Time:    00:26        .patient feels well and requesting to leave. Outpatient followup/referral done. Return precautions given.    Disposition:  discharge.

## 2025-04-04 NOTE — ED NOTES
"Patient states weakness and " bubbly clear" phlem only when she eats, food does not come up, states pain to upper abdomen x 2 weeks, pain right shoulder blade, not eating  "

## 2025-04-05 RX ORDER — CIPROFLOXACIN 500 MG/1
500 TABLET ORAL 2 TIMES DAILY
Qty: 20 TABLET | Refills: 0 | Status: SHIPPED | OUTPATIENT
Start: 2025-04-05 | End: 2025-04-15

## 2025-04-07 PROBLEM — C34.11 MALIGNANT NEOPLASM OF UPPER LOBE OF RIGHT LUNG: Status: ACTIVE | Noted: 2025-04-07

## 2025-04-07 LAB — BACTERIA UR CULT: ABNORMAL

## 2025-04-29 PROBLEM — Z71.89 ACP (ADVANCE CARE PLANNING): Status: ACTIVE | Noted: 2025-04-29

## 2025-04-29 PROBLEM — R09.02 HYPOXIA: Status: ACTIVE | Noted: 2025-04-29

## 2025-04-29 PROBLEM — E87.20 LACTIC ACIDOSIS: Status: ACTIVE | Noted: 2025-04-29

## 2025-04-29 PROBLEM — I5A MYOCARDIAL INJURY: Status: ACTIVE | Noted: 2025-04-29

## 2025-04-29 PROBLEM — E80.6 HYPERBILIRUBINEMIA: Status: ACTIVE | Noted: 2025-04-29

## 2025-05-06 PROBLEM — R60.1 ANASARCA: Status: ACTIVE | Noted: 2025-05-06

## 2025-05-06 PROBLEM — E44.1 MILD MALNUTRITION: Status: ACTIVE | Noted: 2025-05-06
